# Patient Record
Sex: FEMALE | Race: WHITE | Employment: FULL TIME | URBAN - METROPOLITAN AREA
[De-identification: names, ages, dates, MRNs, and addresses within clinical notes are randomized per-mention and may not be internally consistent; named-entity substitution may affect disease eponyms.]

---

## 2017-11-07 ENCOUNTER — HOSPITAL ENCOUNTER (OUTPATIENT)
Dept: MAMMOGRAPHY | Age: 47
Discharge: HOME | End: 2017-11-07

## 2017-11-07 DIAGNOSIS — Z12.31 VISIT FOR SCREENING MAMMOGRAM: ICD-10-CM

## 2017-11-07 PROCEDURE — 77067 SCR MAMMO BI INCL CAD: CPT

## 2017-11-13 ENCOUNTER — HOSPITAL ENCOUNTER (OUTPATIENT)
Dept: MAMMOGRAPHY | Age: 47
Discharge: HOME | End: 2017-11-13
Payer: COMMERCIAL

## 2017-11-13 DIAGNOSIS — R92.8 ABNORMAL SCREENING MAMMOGRAM: ICD-10-CM

## 2017-11-13 PROCEDURE — 77066 DX MAMMO INCL CAD BI: CPT

## 2017-11-13 PROCEDURE — 76642 ULTRASOUND BREAST LIMITED: CPT

## 2018-09-14 PROBLEM — I78.1 SPIDER VEINS: Status: ACTIVE | Noted: 2018-09-14

## 2018-09-14 PROBLEM — M79.604 RIGHT LEG PAIN: Status: ACTIVE | Noted: 2018-09-14

## 2018-11-16 ENCOUNTER — HOSPITAL ENCOUNTER (OUTPATIENT)
Dept: MAMMOGRAPHY | Age: 48
Discharge: HOME | End: 2018-11-16

## 2018-11-16 DIAGNOSIS — Z12.31 VISIT FOR SCREENING MAMMOGRAM: ICD-10-CM

## 2018-11-16 PROCEDURE — 77067 SCR MAMMO BI INCL CAD: CPT

## 2019-04-08 ENCOUNTER — HOSPITAL ENCOUNTER (OUTPATIENT)
Dept: LAB | Age: 49
Discharge: HOME | End: 2019-04-08

## 2019-04-08 PROCEDURE — 88305 TISSUE EXAM BY PATHOLOGIST: CPT

## 2019-11-05 ENCOUNTER — HOSPITAL ENCOUNTER (OUTPATIENT)
Dept: MAMMOGRAPHY | Age: 49
Discharge: HOME | End: 2019-11-05

## 2019-11-05 DIAGNOSIS — Z12.31 VISIT FOR SCREENING MAMMOGRAM: ICD-10-CM

## 2019-11-05 PROCEDURE — 77067 SCR MAMMO BI INCL CAD: CPT

## 2019-11-11 ENCOUNTER — HOSPITAL ENCOUNTER (OUTPATIENT)
Dept: MAMMOGRAPHY | Age: 49
Discharge: HOME | End: 2019-11-11
Payer: COMMERCIAL

## 2019-11-11 DIAGNOSIS — R92.8 ABNORMAL SCREENING MAMMOGRAM: ICD-10-CM

## 2019-11-11 PROCEDURE — 76642 ULTRASOUND BREAST LIMITED: CPT

## 2021-05-04 ENCOUNTER — TRANSCRIBE ORDER (OUTPATIENT)
Dept: REGISTRATION | Age: 51
End: 2021-05-04

## 2021-05-04 DIAGNOSIS — Z12.31 VISIT FOR SCREENING MAMMOGRAM: Primary | ICD-10-CM

## 2021-06-17 ENCOUNTER — TRANSCRIBE ORDER (OUTPATIENT)
Dept: SCHEDULING | Age: 51
End: 2021-06-17

## 2021-06-17 DIAGNOSIS — Z12.31 ENCOUNTER FOR SCREENING MAMMOGRAM FOR MALIGNANT NEOPLASM OF BREAST: Primary | ICD-10-CM

## 2021-06-22 ENCOUNTER — TRANSCRIBE ORDER (OUTPATIENT)
Dept: SCHEDULING | Age: 51
End: 2021-06-22

## 2021-06-22 DIAGNOSIS — N63.10 BREAST MASS, RIGHT: Primary | ICD-10-CM

## 2021-06-28 ENCOUNTER — HOSPITAL ENCOUNTER (OUTPATIENT)
Dept: MAMMOGRAPHY | Age: 51
Discharge: HOME | End: 2021-06-28
Attending: NURSE PRACTITIONER
Payer: COMMERCIAL

## 2021-06-28 DIAGNOSIS — N63.10 BREAST MASS, RIGHT: ICD-10-CM

## 2021-06-28 PROCEDURE — 77066 DX MAMMO INCL CAD BI: CPT

## 2021-06-28 PROCEDURE — 76642 ULTRASOUND BREAST LIMITED: CPT

## 2022-02-24 ENCOUNTER — TRANSCRIBE ORDER (OUTPATIENT)
Dept: SCHEDULING | Age: 52
End: 2022-02-24

## 2022-02-24 DIAGNOSIS — Z12.31 ENCOUNTER FOR SCREENING MAMMOGRAM FOR MALIGNANT NEOPLASM OF BREAST: Primary | ICD-10-CM

## 2022-03-03 ENCOUNTER — TRANSCRIBE ORDER (OUTPATIENT)
Dept: SCHEDULING | Age: 52
End: 2022-03-03

## 2022-03-03 DIAGNOSIS — Z12.31 VISIT FOR SCREENING MAMMOGRAM: Primary | ICD-10-CM

## 2022-03-19 PROBLEM — I78.1 SPIDER VEINS: Status: ACTIVE | Noted: 2018-09-14

## 2022-03-19 PROBLEM — M79.604 RIGHT LEG PAIN: Status: ACTIVE | Noted: 2018-09-14

## 2022-06-29 ENCOUNTER — HOSPITAL ENCOUNTER (OUTPATIENT)
Dept: MAMMOGRAPHY | Age: 52
Discharge: HOME | End: 2022-07-02
Payer: COMMERCIAL

## 2022-06-29 DIAGNOSIS — Z12.31 VISIT FOR SCREENING MAMMOGRAM: ICD-10-CM

## 2022-06-29 PROCEDURE — 77063 BREAST TOMOSYNTHESIS BI: CPT

## 2022-07-18 LAB
AVERAGE GLUCOSE: NORMAL
HBA1C MFR BLD: 5.4 %

## 2022-12-05 NOTE — PROGRESS NOTES
[facility]  [address] [place] [address]  Mauricio Wood, [address] [facility]  [phone]     2022  Patient:  Caryn Leon  : 1970    JENAE Leon is a 45 y.o. female who presents with a pancreatic cyst.    The patient states that she had a laparoscopic cholecystectomy on 10/16/222. 10 days after her operation she began to have similar pain to her previous gallbladder attacks in the epigastric region and went to the ER and had a CT scan. The patient underwent a CT scan on 2022 which revealed a cystic structure measuring 3.8 cm arising from the pancreatic tail. The patient then underwent an MRI on 2022 which revealed a large thin walled unilocular cyst arising from the pancreatic tail measuring 4.6 cm without additional suspicious features. The patient states that she has not had any pain since returning to the emergency department. She is tolerating a diet. She denies any nausea or emesis. She has no known history of pancreatitis. She states that she rarely drinks, she will have maybe 1 drink/week. She has lost 60 lbs in the last year through diet and exercise. She has started a new weight loss program this year. She has no other significant past medical history. She has a surgical history significant as listed above for a lap myla. She is not on any blood thinning medications. Past Medical History:   Diagnosis Date    Leg pain, right 2018    since 2018     Current Outpatient Medications   Medication Sig Dispense Refill    ibuprofen (ADVIL;MOTRIN) 200 MG tablet Take by mouth       No current facility-administered medications for this visit.      No Known Allergies  Past Surgical History:   Procedure Laterality Date    CHOLECYSTECTOMY       Family History   Problem Relation Age of Onset    Other Father         malignant hyperthermia    Cancer Father         prostate    Breast Cancer Neg Hx      Social History     Tobacco Use    Smoking status: Never    Smokeless tobacco: Never   Substance Use Topics    Alcohol use: Not on file        Review of Systems   A comprehensive review of systems was negative except for that written in the HPI.      Physical Exam  /78   Pulse 67   Ht 5' 6\" (1.676 m)   Wt 190 lb (86.2 kg)   SpO2 98%   BMI 30.67 kg/m²      General: Alert, oriented, cooperative, awake patient in no acute distress   Skin:  Warm, moist with good texture   Eyes:   Sclera are clear, extraocular muscles intact  HENT:  Normocephalic; oral mucosa moist, nares patent; neck is supple; trachea midline  Respiratory: Lungs clear to auscultation bilaterally, breathing is non-labored   Chest:  Symmetric throughout one respiratory excursion; no supraclavicular lymphadenopathy  CV:  Regular rate and rhythm, no appreciable murmurs, rubs, gallops  Abdomen: Soft, protuberant but non-distended; bowel sounds are normoactive   Extremities: No cyanosis, clubbing or edema  Neurological: No focal signs      Labs:   WBC 3.5 - 10.8 K/uL 8.8    RBC 3.86 - 5.35 M/uL 3.83 Low     Hemoglobin 11.0 - 15.4 g/dL 11.7    Hematocrit 35.6 - 47.3 % 36.4    MCV 79.0 - 100.0 fL 95.0    MCH 23.7 - 32.9 pg 30.5    MCHC 30.0 - 36.5 g/dL 32.1    RDW-CV 11.6 - 16.0 % 13.7    Platelets 482 - 325 K/uL 280    MPV 9.2 - 12.8 fL 9.9    Neutrophils, % % 87.3    Lymphocytes, % % 4.6    Monocytes, % % 7.4    Eosinophils % % 0.1    Basophils % % 0.3    Neutrophils, Abs 1.56 - 6.13 K/uL 7.70 High     Lymphocytes, Abs 1.18 - 3.74 K/uL 0.40 Low     Monocytes, Abs 0.24 - 0.86 K/uL 0.70    Eosinophils, Abs 0.04 - 0.36 K/uL <0.03 Low     Basophils, Abs 0.01 - 0.08 K/uL <0.03    Immature Granulocytes, % % 0.3    Immature Granulocytes, Abs 0.00 - 0.03 K/uL 0.00    nRBC Percent 0 % 0    nRBC Abs 0.0 - 0.2 K/uL 0.0       Ref Range & Units 11/1/22 0429   Sodium 136 - 145 mmol/L 140    Potassium 3.5 - 5.1 mmol/L 4.0    Chloride 98 - 107 mmol/L 105    CO2 20 - 30 mmol/L 29    BUN 7 - 20 mg/dL 15    Calcium 8.5 - 10.4 mg/dL 9.4    Creatinine 0.57 - 1.11 mg/dL 0.80    Glucose 70 - 99 mg/dL 164 High     Anion Gap 6 - 16 mmol/L 6       Ref Range & Units 11/1/22 0429   Albumin 3.5 - 5.0 g/dL 3.9    Bilirubin, Total 0.1 - 1.2 mg/dL 1.0    Bilirubin, Direct 0.0 - 0.5 mg/dL 0.7 High     Bilirubin, Indirect 0.0 - 1.2 mg/dL 0.3    Alkaline Phosphatase 40 - 150 IU/L 401 High     AST 5 - 34 IU/L 488 High     ALT <55 IU/L 465 High     Protein, Total 6.2 - 8.3 g/dL 7.4       Ref Range & Units 11/1/22 0429   Lipase 8 - 78 IU/L 93 High        Ref Range & Units 11/1/22 0430   Hep B Surf Antigen [facility]    Comment: Considered negative for HBsAg   Hep A Antibody, IgM Non-Reactive Non-Reactive    Comment: IgM anti-HAV not detected. Does not exclude the possibility of exposure to or infection with HAV. Levels of IgM anti-HAV may be below the cut-off in early infection. Hep B Core Ab, IgM Non-Reactive Non-Reactive    Comment: IgM anti-HBc not detected. Does not exclude the possibility of exposure to or infections with HBV. Hep C Antibody Non-Reactive Non-Reactive          MRI  Impression  Performed by [facility]    1. Large thin-walled unilocular cyst arising from the pancreatic tail measuring up to 4.6 cm without additional suspicious features. Given the size of the lesion, surgical consultation and EUS/FNA is recommended. 2. Decreased size of the small fluid collection in the gallbladder fossa. Signed by: 11/28/2022 8:35 AM: Lauren Izquierdo MD, Rakesh Cavazos  Narrative  Performed by [facility] [place]    MRI ABDOMEN WITH AND WITHOUT IV CONTRAST     CLINICAL INFORMATION: K86.2 Cyst of pancreas I10;       COMPARISON: CT 11/1/2022     TECHNIQUE: Multisequence multiplanar MRI of the abdomen without and with contrast.        CONTRAST: 8.5 mL Gadavist IV contrast     FINDINGS:     LIVER AND BILIARY: Normal signal intensity and enhancement of the liver. 8 mm hemangioma in segment 8. Tiny T2 hyperintense cyst in segment 8. No biliary dilation. Cholecystectomy with decreased size of the T2 hyperintense cystic structure/fluid collection in the gallbladder fossa measuring 1.1 x 0.8 cm, previously 2.0 x 2.4 cm. PANCREAS: Normal signal intensity and enhancement of the pancreatic parenchyma. No pancreatic ductal dilation. Thin-walled unilocular cystic structure arising from the pancreatic tail measuring 3.9 x 3.4 x 4.6 cm (TV by AP by CC). No associated mural nodularity, internal septations or abnormal enhancement. SPLEEN: Unremarkable     ADRENALS: Unremarkable     KIDNEYS: Symmetric enhancement. No hydronephrosis. GI TRACT AND PERITONEUM: No bowel obstruction. No free fluid. VASCULATURE: Nonaneurysmal abdominal aorta. LYMPH NODES: No lymphadenopathy. MUSCULOSKELETAL: No acute bony abnormality     LOWER CHEST/LOCALIZER: Unremarkable        CT:  Impression  Performed by [facility]    1.  Status post cholecystectomy. Small fluid collection in the gallbladder fossa which is sterility indeterminant. 2.  Trace pneumoperitoneum likely related to the recent prior surgery. 3. Cystic structure measuring 3.8 cm arising from the pancreatic tail. Recommend outpatient abdominal MRI with and without contrast to further evaluate. 4. Nonobstructing right renal calculus. Signed by: 11/1/2022 7:28 AM: Valerie Lomeli MD, Leilani Burnette  Narrative  Performed by [facility]        Indication: Acute epigastric pain     Comparison: Ultrasound 10/22/2022     Technique: Axial CT images were obtained of the abdomen and pelvis with intravenous contrast. Oral contrast was not administered. Coronal and sagittal reformats were generated. 100 mL of Omnipaque 350 intravenous contrast was administered. Findings:     Lower chest: Mild atelectasis in the lower lobes bilaterally. Liver: Tiny hypodensity in the right hepatic lobe near the dome. This is too small to definitively characterize by CT. Mild periportal edema.      Gallbladder and Bile Ducts: Cholecystectomy. Small peripherally enhancing cystic structure in the gallbladder fossa (series 201, image 59) measuring 2 x 2.4 cm in axial dimensions and approximately 2.9 cm craniocaudal. No biliary ductal dilatation. Spleen: Normal     Pancreas: Cystic lesion along the body of the pancreatic tail measuring 3.8 cm. Adrenal Glands: Normal     Kidneys: No hydronephrosis. Calculus in the right lower pole measuring 7 mm. Reproductive: IUD in place. Pelvis: Normal.     Bowel:   No dilated bowel loops. No mural thickening or adjacent inflammatory stranding. Appendix not confidently identified. No inflammatory changes in the right lower quadrant. Vasculature: Normal.     Peritoneum/Extraperitoneum: Tiny scattered foci of pneumoperitoneum, likely to recent surgery. No significant free fluid. Lymph nodes: No adenopathy. Musculoskeletal: No acute osseous abnormality. Assessment/Plan:  Jacob Perez is a 45 y.o. female who has signs and symptoms consistent with a cyst on cyst tail of the pancreas. It was discussed with the patient that from her CT scan and MRI reports the cyst does not appear to have suspicious features. It was recommended that she undergo an EUS with FNA to have better evaluation and characterization of the cyst to see if it is serous or mucinous. She will be referred to gastroenterology for EUS. She should follow up in 4 weeks after she has undergone this procedure. If it is found to be mucinous we will then discuss possible surgical intervention.      Total time spent with patient including chart review is 45 minutes     Constanza Diego MD

## 2022-12-06 ENCOUNTER — OFFICE VISIT (OUTPATIENT)
Dept: SURGERY | Age: 52
End: 2022-12-06

## 2022-12-06 VITALS
BODY MASS INDEX: 30.53 KG/M2 | HEIGHT: 66 IN | OXYGEN SATURATION: 98 % | SYSTOLIC BLOOD PRESSURE: 122 MMHG | WEIGHT: 190 LBS | HEART RATE: 67 BPM | DIASTOLIC BLOOD PRESSURE: 78 MMHG

## 2022-12-06 DIAGNOSIS — K86.2 PANCREATIC CYST: Primary | ICD-10-CM

## 2022-12-07 PROBLEM — K86.2 PANCREATIC CYST: Status: ACTIVE | Noted: 2022-12-07

## 2023-01-13 ENCOUNTER — OFFICE VISIT (OUTPATIENT)
Dept: SURGERY | Age: 53
End: 2023-01-13
Payer: COMMERCIAL

## 2023-01-13 VITALS — HEIGHT: 66 IN | WEIGHT: 190 LBS | BODY MASS INDEX: 30.53 KG/M2

## 2023-01-13 DIAGNOSIS — K86.2 PANCREATIC CYST: Primary | ICD-10-CM

## 2023-01-13 PROCEDURE — 99214 OFFICE O/P EST MOD 30 MIN: CPT | Performed by: SURGERY

## 2023-01-13 NOTE — PROGRESS NOTES
[facility]  [address] [place] [address]  [phone]     2023  Patient:  Mary Watson  : 1970    HPI  Mary Watson is a 45 y.o. female who is back to review her EUS result. She has a distal pancreatic cyst. EUS showed suspicious side branch IPMN measuring at 48 mm, however, fluid CEA is only 24, and cytology is negative. On 2022, she presented with a pancreatic cyst. The patient states that she had a laparoscopic cholecystectomy on 10/16/222. 10 days after her operation she began to have similar pain to her previous gallbladder attacks in the epigastric region and went to the ER and had a CT scan. The patient underwent a CT scan on 2022 which revealed a cystic structure measuring 3.8 cm arising from the pancreatic tail. The patient then underwent an MRI on 2022 which revealed a large thin walled unilocular cyst arising from the pancreatic tail measuring 4.6 cm without additional suspicious features. The patient states that she has not had any pain since returning to the emergency department. She is tolerating a diet. She denies any nausea or emesis. She has no known history of pancreatitis. She states that she rarely drinks, she will have maybe 1 drink/week. She has lost 60 lbs in the last year through diet and exercise. She has started a new weight loss program this year. She has no other significant past medical history. She has a surgical history significant as listed above for a lap myla. She is not on any blood thinning medications. Past Medical History:   Diagnosis Date    Leg pain, right 2018    since 2018     Current Outpatient Medications   Medication Sig Dispense Refill    ibuprofen (ADVIL;MOTRIN) 200 MG tablet Take by mouth       No current facility-administered medications for this visit.      No Known Allergies  Past Surgical History:   Procedure Laterality Date    CHOLECYSTECTOMY Family History   Problem Relation Age of Onset    Other Father         malignant hyperthermia    Cancer Father         prostate    Breast Cancer Neg Hx      Social History     Tobacco Use    Smoking status: Never    Smokeless tobacco: Never   Substance Use Topics    Alcohol use: Not on file        Review of Systems   A comprehensive review of systems was negative except for that written in the HPI. Physical Exam  Ht 5' 6\" (1.676 m)   Wt 190 lb (86.2 kg)   BMI 30.67 kg/m²      General: Alert, oriented, cooperative, awake patient in no acute distress   Skin:  Warm, moist with good texture   Eyes:   Sclera are clear, extraocular muscles intact  HENT:  Normocephalic; oral mucosa moist, nares patent; neck is supple; trachea midline  Respiratory: Lungs clear to auscultation bilaterally, breathing is non-labored   Chest:  Symmetric throughout one respiratory excursion; no supraclavicular lymphadenopathy  CV:  Regular rate and rhythm, no appreciable murmurs, rubs, gallops  Abdomen: Soft, protuberant but non-distended; bowel sounds are normoactive   Extremities: No cyanosis, clubbing or edema  Neurological: No focal signs      CT:      MRI:      EUS:  Date of Procedure: 12/22/2022    Patient: Jaimee Greenwood 1970    Indication: Pancreatic cyst    Sedation: MAC    Pre-Procedure Physical Exam:    Mental status: alert and oriented  Airway: normal oropharyngeal airway and neck mobility  CV: regular rate and rhythm  Respiratory: clear to auscultation    Procedure:  A History and Physical has been performed, and patient medication allergies have been reviewed. Risks of perforation, hemorrhage, adverse drug reaction, and aspiration were discussed. Informed consent was obtained for the procedure, including sedation. The patient was placed in the left lateral decubitus position. The heart rate, oxygen saturations, blood pressure, and response to care were monitored throughout the procedure.      The linear echoendoscope was passed through the mouth and advanced under direct vision to the distal duodenum. As the scope was slowly withdrawn, detailed endoscopic images were obtained from the surrounding organs. The patient tolerated the procedure well. Findings:     ENDOSCOPIC FINDINGS: Limited views of the mucosa with the echoendoscope reveals no gross abnormalities of the stomach or proximal duodenum. The ampulla is well-visualized endoscopically and appears normal.    PANCREAS: The pancreas is well-visualized from head to tail. There is no evidence of chronic pancreatitis. There is a simple cyst in the tail of the pancreas measuring 48 x 35 mm. It is oval-shaped, with a thin wall, and without septations or mural nodules. There is apparent communication with the pancreatic duct. After IV Levaquin was given, fine needle was performed using a 22-gauge [employer] needle. A single pass was performed, yielding 5 ml thick clear fluid. No additional pancreatic lesions were visualized. The main pancreatic duct is of normal caliber with a smooth, regular course, measuring up to 3 mm in the head, 2 mm in the body and 1 mm in the tail. Pancreas divisum is not seen. BILIARY TREE: The gallbladder appears normal. The common bile duct is well-visualized from its insertion in the ampulla to the bifurcation of right and left hepatic ducts. It is non-dilated, measuring up to 5 mm maximally with a gradual taper down to the level of the ampulla. There are no intraductal stones, sludge or debris. The ampulla appears normal endosonographically. OTHER ORGANS: Views of the left lobe of the liver demonstrate no solid mass lesions. There is no ascites in the upper abdomen. The left adrenal gland appears normal. The major vascular structures including the portal vein, splenic vessels and celiac artery appear unremarkable. There are no pathologically enlarged posterior mediastinal or upper abdominal lymph nodes.     Specimen: Yes    Estimated Blood Loss: 3 ml    Implant: None    Impression:   1. Pancreatic cyst. This is suspicious for side branch IPMN or mucinous cystic neoplasm. FNA was performed. Plan:  1. Follow up results of cytology, biochemical and molecular analysis. 2. Avoid NSAIDs for 48 hours. 3. Further recommendations will be based on pathology results. Signed:  Nupur Hdz MD  12/22/2022  8:25 AM      Fluid analysis:      Cytology:        Assessment/Plan:   Linda Foley is a 45 y.o. female who has signs and symptoms consistent with distal pancreatic cyst, most likely serous cyst based on fluid CEA level, which has low risk for cancer. Recommend to repeat scan in a year, if stable could scan once in a few year unless symptomatic.      Pam Caro MD

## 2023-01-31 DIAGNOSIS — K86.2 PANCREATIC CYST: Primary | ICD-10-CM

## 2023-02-01 ENCOUNTER — HOSPITAL ENCOUNTER (OUTPATIENT)
Dept: LAB | Age: 53
Discharge: HOME | End: 2023-02-04
Payer: COMMERCIAL

## 2023-02-01 ENCOUNTER — OFFICE VISIT (OUTPATIENT)
Dept: ONCOLOGY | Age: 53
End: 2023-02-01
Payer: COMMERCIAL

## 2023-02-01 VITALS
WEIGHT: 189 LBS | HEART RATE: 82 BPM | SYSTOLIC BLOOD PRESSURE: 97 MMHG | BODY MASS INDEX: 30.37 KG/M2 | OXYGEN SATURATION: 99 % | RESPIRATION RATE: 16 BRPM | TEMPERATURE: 97.7 F | DIASTOLIC BLOOD PRESSURE: 65 MMHG | HEIGHT: 66 IN

## 2023-02-01 DIAGNOSIS — K86.2 PANCREATIC CYST: Primary | ICD-10-CM

## 2023-02-01 DIAGNOSIS — K86.2 PANCREATIC CYST: ICD-10-CM

## 2023-02-01 LAB
ALBUMIN SERPL-MCNC: 4.4 G/DL (ref 3.5–5)
ALBUMIN/GLOB SERPL: 1.2 (ref 0.4–1.6)
ALP SERPL-CCNC: 77 U/L (ref 50–136)
ALT SERPL-CCNC: 29 U/L (ref 12–65)
ANION GAP SERPL CALC-SCNC: 3 MMOL/L (ref 2–11)
AST SERPL-CCNC: 13 U/L (ref 15–37)
BASOPHILS # BLD: 0 K/UL (ref 0–0.2)
BASOPHILS NFR BLD: 1 % (ref 0–2)
BILIRUB SERPL-MCNC: 0.6 MG/DL (ref 0.2–1.1)
BUN SERPL-MCNC: 17 MG/DL (ref 6–23)
CALCIUM SERPL-MCNC: 10 MG/DL (ref 8.3–10.4)
CEA SERPL-MCNC: 1.8 NG/ML (ref 0–3)
CHLORIDE SERPL-SCNC: 106 MMOL/L (ref 101–110)
CO2 SERPL-SCNC: 31 MMOL/L (ref 21–32)
CREAT SERPL-MCNC: 0.9 MG/DL (ref 0.6–1)
DIFFERENTIAL METHOD BLD: ABNORMAL
EOSINOPHIL # BLD: 0.1 K/UL (ref 0–0.8)
EOSINOPHIL NFR BLD: 1 % (ref 0.5–7.8)
ERYTHROCYTE [DISTWIDTH] IN BLOOD BY AUTOMATED COUNT: 13.2 % (ref 11.9–14.6)
GLOBULIN SER CALC-MCNC: 3.7 G/DL (ref 2.8–4.5)
GLUCOSE SERPL-MCNC: 92 MG/DL (ref 65–100)
HCT VFR BLD AUTO: 40.4 % (ref 35.8–46.3)
HGB BLD-MCNC: 12.4 G/DL (ref 11.7–15.4)
IMM GRANULOCYTES # BLD AUTO: 0 K/UL (ref 0–0.5)
IMM GRANULOCYTES NFR BLD AUTO: 0 % (ref 0–5)
LYMPHOCYTES # BLD: 1.6 K/UL (ref 0.5–4.6)
LYMPHOCYTES NFR BLD: 30 % (ref 13–44)
MCH RBC QN AUTO: 29 PG (ref 26.1–32.9)
MCHC RBC AUTO-ENTMCNC: 30.7 G/DL (ref 31.4–35)
MCV RBC AUTO: 94.4 FL (ref 82–102)
MONOCYTES # BLD: 0.4 K/UL (ref 0.1–1.3)
MONOCYTES NFR BLD: 7 % (ref 4–12)
NEUTS SEG # BLD: 3.3 K/UL (ref 1.7–8.2)
NEUTS SEG NFR BLD: 61 % (ref 43–78)
NRBC # BLD: 0 K/UL (ref 0–0.2)
PLATELET # BLD AUTO: 312 K/UL (ref 150–450)
PMV BLD AUTO: 9.8 FL (ref 9.4–12.3)
POTASSIUM SERPL-SCNC: 4.2 MMOL/L (ref 3.5–5.1)
PROT SERPL-MCNC: 8.1 G/DL (ref 6.3–8.2)
RBC # BLD AUTO: 4.28 M/UL (ref 4.05–5.2)
SODIUM SERPL-SCNC: 140 MMOL/L (ref 133–143)
WBC # BLD AUTO: 5.4 K/UL (ref 4.3–11.1)

## 2023-02-01 PROCEDURE — 99244 OFF/OP CNSLTJ NEW/EST MOD 40: CPT | Performed by: INTERNAL MEDICINE

## 2023-02-01 PROCEDURE — 80053 COMPREHEN METABOLIC PANEL: CPT

## 2023-02-01 PROCEDURE — 85025 COMPLETE CBC W/AUTO DIFF WBC: CPT

## 2023-02-01 PROCEDURE — 36415 COLL VENOUS BLD VENIPUNCTURE: CPT

## 2023-02-01 PROCEDURE — 82378 CARCINOEMBRYONIC ANTIGEN: CPT

## 2023-02-01 RX ORDER — ADHESIVE TAPE 3"X 2.3 YD
TAPE, NON-MEDICATED TOPICAL
COMMUNITY

## 2023-02-01 ASSESSMENT — PATIENT HEALTH QUESTIONNAIRE - PHQ9
SUM OF ALL RESPONSES TO PHQ9 QUESTIONS 1 & 2: 0
SUM OF ALL RESPONSES TO PHQ QUESTIONS 1-9: 0
1. LITTLE INTEREST OR PLEASURE IN DOING THINGS: 0
SUM OF ALL RESPONSES TO PHQ QUESTIONS 1-9: 0
2. FEELING DOWN, DEPRESSED OR HOPELESS: 0

## 2023-02-01 NOTE — PROGRESS NOTES
[facility] Hematology & Oncology: Office Visit New Patient H/P    Chief Complaint:    History of clotting disorder    History of Present Illness:  Reason for Referral:  Clotting disorder     Referring Provider:  Ladi Cyr NP, [facility]     Primary Care Provider:  Tyrone Steiner NP, [facility]     Family History of Cancer/Hematologic Disorders:  Father with prostate cancer and malignant hyperthermia. Presenting Symptoms:   History of clotting disorder     Ms. Nicolás Shore is a 45 y.o.  female with a medical history of colon polyps, asthma, HPV, malignant hyperthermia, and clotting disorder diagnosed after placental abruption. Surgical history includes cholecystectomy (10/22). She denies use of any tobacco products or drugs but does consume alcohol socially. Documented that patient was diagnosed with a clotting disorder after a placental abruption. There are no supporting labs available including what year this occurred to give any specifics. There was no further follow up with hematology. On 12/9/22 she was seen on consult with GI and requested referral to hematology for disorder. On 10/22/22 she presented to the [facility] ED with sudden onset epigastric pain. An [address] was completed which showed cholelithiasis with gallbladder wall thickening. She was admitted with plan to proceed with cholecystectomy. 10 days post op she presented back to [facility] ED with same epigastric pain. CT imaging showed a 3.8 cm cystic structure arising from the pancreatic tail. MRI was recommended and completed on 11/28/22 which confirmed findings. EUS was recommended. On 12/6/22 she was seen in consult by Dr. Marielena Prince for evaluation. [facility]. This procedure was performed on 12/22/22. Pancreatic cyst is suspicious for side branch IPMN or mucinous cystic neoplasm. FNA was performed.   Pathology showed scant extracellular mucin present, virtually acellular specimen. CEA 24 and amylase 74 on pancreatic cyst fluid. On 1/13/23 she was seen in follow up with Dr. Caffie Cockayne.  Pathology reviewed. Recommendation was to follow up in 1 year with repeat imaging or sooner if symptomatic.        10/22/22 [facility] US Abd  FINDINGS:   LIVER: Normal echogenicity of the hepatic parenchyma. Liver is enlarged measuring 19.2 cm. Minimal intrahepatic biliary dilation. Common bile duct measures 6 mm. Patent hepatopedal main portal vein. GALLBLADDER: Multiple mobile gallstones noted. Adenomyomatosis along the anterior gallbladder wall. Mildly distended gallbladder. Gallbladder wall is mildly thickened measuring 5 mm. Trace pericholecystic fluid. Negative sonographic Dahl sign, though the patient had received pain medication prior to the examination and this finding is unreliable. PANCREAS: Normal imaged portions of the pancreatic head and proximal body. RIGHT KIDNEY: Normal cortical thickness and echogenicity. No hydronephrosis. Nonobstructing 8 mm lower pole calculus. ASCITES: None. IMPRESSION:   1. Cholelithiasis with gallbladder wall thickening and trace pericholecystic fluid, suspicious for acute cholecystitis. Negative sonographic Dahl sign, though the patient had received pain medication prior to the examination and this finding is unreliable. Could consider further evaluation with nuclear medicine HIDA scan if indicated. 2. Minimal intrahepatic biliary dilation with normal caliber of the common bile duct and no obstructing calculus identified. Correlate with LFTs. Could consider further evaluation with MRCP or ERCP if indicated. 3. Hepatomegaly. 4. Focal gallbladder wall adenomyomatosis. 10/22/22 [facility] Cholecystectomy   Final Diagnosis   Gallbladder, cholecystectomy:  Cholelithiasis with chronic cholecystitis. 11/1/22 [facility] CT Abd/Pelvis  Findings:   Lower chest: Mild atelectasis in the lower lobes bilaterally.    Liver: Tiny hypodensity in the right hepatic lobe near the dome. This is too small to definitively characterize by CT. Mild periportal edema. Gallbladder and Bile Ducts: Cholecystectomy. Small peripherally enhancing cystic structure in the gallbladder fossa (series 201, image 59) measuring 2 x 2.4 cm in axial dimensions and approximately 2.9 cm craniocaudal. No biliary ductal dilatation. Spleen: Normal   Pancreas: Cystic lesion along the body of the pancreatic tail measuring 3.8 cm. Adrenal Glands: Normal   Kidneys: No hydronephrosis. Calculus in the right lower pole measuring 7 mm. Reproductive: IUD in place. Pelvis: Normal.   Bowel:   No dilated bowel loops. No mural thickening or adjacent inflammatory stranding. Appendix not confidently identified. No inflammatory changes in the right lower quadrant. Vasculature: Normal.   Peritoneum/Extraperitoneum: Tiny scattered foci of pneumoperitoneum, likely to recent surgery. No significant free fluid. Lymph nodes: No adenopathy. Musculoskeletal: No acute osseous abnormality. IMPRESSION:   1. Status post cholecystectomy. Small fluid collection in the gallbladder fossa which is sterility indeterminant. 2.  Trace pneumoperitoneum likely related to the recent prior surgery. 3. Cystic structure measuring 3.8 cm arising from the pancreatic tail. Recommend outpatient abdominal MRI with and without contrast to further evaluate. 4. Nonobstructing right renal calculus. 11/28/22 [facility] MRI Abd  FINDINGS:   LIVER AND BILIARY: Normal signal intensity and enhancement of the liver. 8 mm hemangioma in segment 8. Tiny T2 hyperintense cyst in segment 8. No biliary dilation. Cholecystectomy with decreased size of the T2 hyperintense cystic structure/fluid collection in the gallbladder fossa measuring 1.1 x 0.8 cm, previously 2.0 x 2.4 cm. PANCREAS: Normal signal intensity and enhancement of the pancreatic parenchyma. No pancreatic ductal dilation.  Thin-walled unilocular cystic structure arising from the pancreatic tail measuring 3.9 x 3.4 x 4.6 cm (TV by AP by CC). No associated mural nodularity, internal septations or abnormal enhancement. SPLEEN: Unremarkable      ADRENALS: Unremarkable   KIDNEYS: Symmetric enhancement. No hydronephrosis. GI TRACT AND PERITONEUM: No bowel obstruction. No free fluid. VASCULATURE: Nonaneurysmal abdominal aorta. LYMPH NODES: No lymphadenopathy. MUSCULOSKELETAL: No acute bony abnormality   LOWER CHEST/LOCALIZER: Unremarkable      IMPRESSION:   1. Large thin-walled unilocular cyst arising from the pancreatic tail measuring up to 4.6 cm without additional suspicious features. Given the size of the lesion, surgical consultation and EUS/FNA is recommended. 2. Decreased size of the small fluid collection in the gallbladder fossa. 12/22/22 [facility] EUS and Pathology  Findings:   ENDOSCOPIC FINDINGS: Limited views of the mucosa with the echoendoscope reveals no gross abnormalities of the stomach or proximal duodenum. The ampulla is well-visualized endoscopically and appears normal.  PANCREAS: The pancreas is well-visualized from head to tail. There is no evidence of chronic pancreatitis. There is a simple cyst in the tail of the pancreas measuring 48 x 35 mm. It is oval-shaped, with a thin wall, and without septations or mural nodules. There is apparent communication with the pancreatic duct. After IV Levaquin was given, fine needle was performed using a 22-gauge [employer] needle. A single pass was performed, yielding 5 ml thick clear fluid. No additional pancreatic lesions were visualized. The main pancreatic duct is of normal caliber with a smooth, regular course, measuring up to 3 mm in the head, 2 mm in the body and 1 mm in the tail. Pancreas divisum is not seen.    BILIARY TREE: The gallbladder appears normal. The common bile duct is well-visualized from its insertion in the ampulla to the bifurcation of right and left hepatic ducts. It is non-dilated, measuring up to 5 mm maximally with a gradual taper down to the level of the ampulla. There are no intraductal stones, sludge or debris. The ampulla appears normal endosonographically. OTHER ORGANS: Views of the left lobe of the liver demonstrate no solid mass lesions. There is no ascites in the upper abdomen. The left adrenal gland appears normal. The major vascular structures including the portal vein, splenic vessels and celiac artery appear unremarkable. There are no pathologically enlarged posterior mediastinal or upper abdominal lymph nodes. Specimen: Yes     Impression:   1. Pancreatic cyst. This is suspicious for side branch IPMN or mucinous cystic neoplasm. FNA was performed. Notes from Referring Provider: None     Other Pertinent Information:  Covid vaccination - 4/6/21 and 5/4/21     Follow up with Dr. Jana Ferrer in 1 year with repeat imaging to follow pancreatic cyst.      Review of Systems:  Constitutional Denies fever or chills. Denies weight loss or appetite changes. Denies fatigue. Denies anorexia. HEENT Denies trauma, bluring vision, hearing loss, ear pain, nosebleeds, sore throat, neck pain and ear discharge. Skin Denies lesions or rashes. Lungs Denies shortness of breath, cough, sputum production or hemoptysis. Cardiovascular Denies chest pain, palpitations, orthopnea, claudication and leg swelling. Gastrointestinal Denies nausea, vomiting, bowel changes. Denies bloody or black stools. Denies abdominal pain.  Denies dysuria, frequency or hesitancy of urination   Neuro Denies headaches, visual changes or ataxia. Denies dizziness, tingling, tremors, sensory change, speech change, focal weakness and headaches. Hematology Denies nasal/gum bleeding, denies easy bruise   Endo Denies heat/cold intolerance, denies diabetes. MSK Denies back pain, swollen legs, myalgias and falls.      Psychiatric/Behavioral Denies depression and substance abuse. The patient is not nervous/anxious. No Known Allergies  Past Medical History:   Diagnosis Date    Leg pain, right 07/2018    since 7/2018     Past Surgical History:   Procedure Laterality Date    CHOLECYSTECTOMY       Family History   Problem Relation Age of Onset    Other Father         malignant hyperthermia    Cancer Father         prostate    Breast Cancer Neg Hx      Social History     Socioeconomic History    Marital status:      Spouse name: Not on file    Number of children: Not on file    Years of education: Not on file    Highest education level: Not on file   Occupational History    Not on file   Tobacco Use    Smoking status: Never    Smokeless tobacco: Never   Substance and Sexual Activity    Alcohol use: Not on file    Drug use: Not on file    Sexual activity: Not on file   Other Topics Concern    Not on file   Social History Narrative    Not on file     Social Determinants of Health     Financial Resource Strain: Not on file   Food Insecurity: Not on file   Transportation Needs: Not on file   Physical Activity: Not on file   Stress: Not on file   Social Connections: Not on file   Intimate Partner Violence: Not on file   Housing Stability: Not on file     Current Outpatient Medications   Medication Sig Dispense Refill    Magnesium Oxide 200 MG TABS Take by mouth      Probiotic Product (PROBIOTIC DAILY PO) Take by mouth      ibuprofen (ADVIL;MOTRIN) 200 MG tablet Take by mouth       No current facility-administered medications for this visit. OBJECTIVE:  BP 97/65 (Site: Right Upper Arm, Position: Standing, Cuff Size: Large Adult)   Pulse 82   Temp 97.7 °F (36.5 °C) (Oral)   Resp 16   Ht 5' 5.5\" (1.664 m)   Wt 189 lb (85.7 kg)   SpO2 99%   BMI 30.97 kg/m²     Physical Exam:  Constitutional: Oriented to person, place, and time. Well-developed and well-nourished. HEENT: Normocephalic and atraumatic.  Oropharynx is clear and moist.   Conjunctivae and EOM are normal. Pupils are equal, round, and reactive to light. No scleral icterus. Neck supple. No JVD present. No tracheal deviation present. No thyromegaly present. Lymph node No palpable submandibular, cervical, supraclavicular, axillary and inguinal lymph nodes. Skin Warm and dry. No bruising and no rash noted. No erythema. No pallor. Respiratory Effort normal and breath sounds normal.  No respiratory distress. No wheezes. No rales. No tenderness. CVS Normal rate, regular rhythm and normal heart sounds. Exam reveals no gallop, no friction and no rub. No murmur heard. Abdomen Soft. Bowel sounds are normal. Exhibits no distension. There is no tenderness. There is no rebound and no guarding. Neuro Normal reflexes. No cranial nerve deficit. Exhibits normal muscle tone, 5 of 5 strength of all extremities. MSK Normal range of motion in general.  No edema and no tenderness.    Psych Normal mood, affect, behavior, judgment and thought content      Labs:  Recent Results (from the past 24 hour(s))   CBC with Auto Differential    Collection Time: 02/01/23  3:26 PM   Result Value Ref Range    WBC 5.4 4.3 - 11.1 K/uL    RBC 4.28 4.05 - 5.2 M/uL    Hemoglobin 12.4 11.7 - 15.4 g/dL    Hematocrit 40.4 35.8 - 46.3 %    MCV 94.4 82.0 - 102.0 FL    MCH 29.0 26.1 - 32.9 PG    MCHC 30.7 (L) 31.4 - 35.0 g/dL    RDW 13.2 11.9 - 14.6 %    Platelets 297 104 - 899 K/uL    MPV 9.8 9.4 - 12.3 FL    nRBC 0.00 0.0 - 0.2 K/uL    Differential Type AUTOMATED      Seg Neutrophils 61 43 - 78 %    Lymphocytes 30 13 - 44 %    Monocytes 7 4.0 - 12.0 %    Eosinophils % 1 0.5 - 7.8 %    Basophils 1 0.0 - 2.0 %    Immature Granulocytes 0 0.0 - 5.0 %    Segs Absolute 3.3 1.7 - 8.2 K/UL    Absolute Lymph # 1.6 0.5 - 4.6 K/UL    Absolute Mono # 0.4 0.1 - 1.3 K/UL    Absolute Eos # 0.1 0.0 - 0.8 K/UL    Basophils Absolute 0.0 0.0 - 0.2 K/UL    Absolute Immature Granulocyte 0.0 0.0 - 0.5 K/UL   Comprehensive Metabolic Panel    Collection Time: 02/01/23  3:26 PM   Result Value Ref Range    Sodium 140 133 - 143 mmol/L    Potassium 4.2 3.5 - 5.1 mmol/L    Chloride 106 101 - 110 mmol/L    CO2 31 21 - 32 mmol/L    Anion Gap 3 2 - 11 mmol/L    Glucose 92 65 - 100 mg/dL    BUN 17 6 - 23 MG/DL    Creatinine 0.90 0.6 - 1.0 MG/DL    Est, Glom Filt Rate >60 >60 ml/min/1.73m2    Calcium 10.0 8.3 - 10.4 MG/DL    Total Bilirubin 0.6 0.2 - 1.1 MG/DL    ALT 29 12 - 65 U/L    AST 13 (L) 15 - 37 U/L    Alk Phosphatase 77 50 - 136 U/L    Total Protein 8.1 6.3 - 8.2 g/dL    Albumin 4.4 3.5 - 5.0 g/dL    Globulin 3.7 2.8 - 4.5 g/dL    Albumin/Globulin Ratio 1.2 0.4 - 1.6     CEA    Collection Time: 02/01/23  3:30 PM   Result Value Ref Range    CEA 1.8 0.0 - 3.0 ng/mL       Imaging:  No results found for this or any previous visit. ASSESSMENT/PLAN:   Diagnosis Orders   1. Pancreatic cyst  CEA        45 y.o. F consulted for clotting disorder presented [facility] on [facility]/1/2023. She had her second pregnancy over 20 years ago complicated with placenta rupture, was hospitalized for 2 days and somehow added to the problem list of clotting disorder. However this was not considered a concern to be followed for work-up at the time, neither she remembers any details, nonetheless she never had any history of thrombosis or abnormal bleeding. She had a emergent cholecystectomy in 10/2022, also had work-up of pancreatic cysts for IPMN, questions were raised regarding his long history of clotting disorder  in her record and consulting hematology.   We discussed the placenta rupture and, anemia associated with abnormal coagulation labs and can be as worse as DIC, and her history of normal pregnancy/delivery twice after the event, multiple surgeries and procedures without issues of bleeding or thrombosis are all against a diagnosis of clinically significant clotting disorder, she agrees and does not desire extensive blood test that will not , but requested to repeat CEA for her pancreatic cyst and understands need to follow-up with surgery for IPMN surveillance. She is elevated for Virchow's triad and healthy lifestyles to mitigate the risk of thrombosis. Return as needed    All questions are answered to her satisfaction. She will call for further questions and concerns. ECOG PERFORMANCE STATUS - 0-Fully active, able to carry on all pre-disease performance without restriction. Pain - 0 - No pain/10. None/Minimal pain - not affecting QOL     Fatigue - No flowsheet data found. Distress - No flowsheet data found. Elements of this note have been dictated via voice recognition software. Text and phrases may be limited by the accuracy and autoconversion of the software. The chart has been reviewed, but errors may still be present. Felisa Reaves M.D.   [address]  Office : [phone]  Fax : [phone]

## 2023-02-01 NOTE — PROGRESS NOTES
[facility]      Reason for Referral:  Clotting disorder    Referring Provider:  Julita Ramachandran NP, [facility]    Primary Care Provider:  Prabha Burgess NP, [facility]    Family History of Cancer/Hematologic Disorders:  Father with prostate cancer and malignant hyperthermia. Presenting Symptoms:   History of clotting disorder    Narrative with recent with Results/Procedures/Biopsies and Dates completed:  Ms. 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 Surgical history includes cholecystectomy (10/22). She denies use of any tobacco products or drugs but does consume alcohol socially. Documented that patient was diagnosed with a clotting disorder after a placental abruption. There are no supporting labs available including what year this occurred to give any specifics. There was no further follow up with hematology. On 12/9/22 she was seen on consult with GI and requested referral to hematology for disorder. On 10/22/22 she presented to the [facility] ED with sudden onset epigastric pain. An [address] was completed which showed cholelithiasis with gallbladder wall thickening. She was admitted with plan to proceed with cholecystectomy. 10 days post op she presented back to [facility] ED with same epigastric pain. CT imaging showed a 3.8 cm cystic structure arising from the pancreatic tail. MRI was recommended and completed on 11/28/22 which confirmed findings. EUS was recommended. On 12/6/22 she was seen in consult by Dr. Temitope Chandra for evaluation. [facility]erral to [facility] for [facility]. This procedure was performed on 12/22/22. Pancreatic cyst is suspicious for side branch IPMN or mucinous cystic neoplasm. FNA was performed. Pathology showed scant extracellular mucin present, virtually acellular specimen.   CEA 24 and amylase 74 on pancreatic cyst fluid.    On 1/13/23 she was seen in follow up with Dr. Franks.  Pathology reviewed.  Recommendation was to follow up in 1 year with repeat imaging or sooner if symptomatic.       10/22/22 [facility] US Abd  FINDINGS:   LIVER: Normal echogenicity of the hepatic parenchyma. Liver is enlarged measuring 19.2 cm. Minimal intrahepatic biliary dilation. Common bile duct measures 6 mm. Patent hepatopedal main portal vein.   GALLBLADDER: Multiple mobile gallstones noted. Adenomyomatosis along the anterior gallbladder wall. Mildly distended gallbladder. Gallbladder wall is mildly thickened measuring 5 mm. Trace pericholecystic fluid. Negative sonographic Dahl sign, though the patient had received pain medication prior to the examination and this finding is unreliable.   PANCREAS: Normal imaged portions of the pancreatic head and proximal body.   RIGHT KIDNEY: Normal cortical thickness and echogenicity. No hydronephrosis. Nonobstructing 8 mm lower pole calculus.   ASCITES: None.     IMPRESSION:   1. Cholelithiasis with gallbladder wall thickening and trace pericholecystic fluid, suspicious for acute cholecystitis. Negative sonographic Dahl sign, though the patient had received pain medication prior to the examination and this finding is unreliable. Could consider further evaluation with nuclear medicine HIDA scan if indicated.   2. Minimal intrahepatic biliary dilation with normal caliber of the common bile duct and no obstructing calculus identified. Correlate with LFTs. Could consider further evaluation with MRCP or ERCP if indicated.   3. Hepatomegaly.   4. Focal gallbladder wall adenomyomatosis.    10/22/22 [facility] Cholecystectomy   Final Diagnosis   Gallbladder, cholecystectomy:  Cholelithiasis with chronic cholecystitis.    11/1/22 [facility] CT Abd/Pelvis  Findings:   Lower chest: Mild atelectasis in the lower lobes bilaterally.   Liver: Tiny hypodensity in the right hepatic lobe near the dome. This is  too small to definitively characterize by CT. Mild periportal edema.   Gallbladder and Bile Ducts: Cholecystectomy. Small peripherally enhancing cystic structure in the gallbladder fossa (series 201, image 59) measuring 2 x 2.4 cm in axial dimensions and approximately 2.9 cm craniocaudal. No biliary ductal dilatation.   Spleen: Normal   Pancreas: Cystic lesion along the body of the pancreatic tail measuring 3.8 cm.   Adrenal Glands: Normal   Kidneys: No hydronephrosis. Calculus in the right lower pole measuring 7 mm.   Reproductive: IUD in place.   Pelvis: Normal.   Bowel:   No dilated bowel loops.   No mural thickening or adjacent inflammatory stranding.   Appendix not confidently identified. No inflammatory changes in the right lower quadrant.   Vasculature: Normal.   Peritoneum/Extraperitoneum: Tiny scattered foci of pneumoperitoneum, likely to recent surgery. No significant free fluid.   Lymph nodes: No adenopathy.   Musculoskeletal: No acute osseous abnormality.     IMPRESSION:   1.  Status post cholecystectomy. Small fluid collection in the gallbladder fossa which is sterility indeterminant.   2.  Trace pneumoperitoneum likely related to the recent prior surgery.   3. Cystic structure measuring 3.8 cm arising from the pancreatic tail. Recommend outpatient abdominal MRI with and without contrast to further evaluate.   4. Nonobstructing right renal calculus.     11/28/22 [facility] MRI Abd  FINDINGS:   LIVER AND BILIARY: Normal signal intensity and enhancement of the liver. 8 mm hemangioma in segment 8. Tiny T2 hyperintense cyst in segment 8. No biliary dilation.  Cholecystectomy with decreased size of the T2 hyperintense cystic structure/fluid collection in the gallbladder fossa measuring 1.1 x 0.8 cm, previously 2.0 x 2.4 cm.   PANCREAS: Normal signal intensity and enhancement of the pancreatic parenchyma. No pancreatic ductal dilation. Thin-walled unilocular cystic structure arising from the pancreatic tail  measuring 3.9 x 3.4 x 4.6 cm (TV by AP by CC). No associated mural nodularity, internal septations or abnormal enhancement. SPLEEN: Unremarkable     ADRENALS: Unremarkable   KIDNEYS: Symmetric enhancement. No hydronephrosis. GI TRACT AND PERITONEUM: No bowel obstruction. No free fluid. VASCULATURE: Nonaneurysmal abdominal aorta. LYMPH NODES: No lymphadenopathy. MUSCULOSKELETAL: No acute bony abnormality   LOWER CHEST/LOCALIZER: Unremarkable     IMPRESSION:   1. Large thin-walled unilocular cyst arising from the pancreatic tail measuring up to 4.6 cm without additional suspicious features. Given the size of the lesion, surgical consultation and EUS/FNA is recommended. 2. Decreased size of the small fluid collection in the gallbladder fossa. 12/22/22 [facility] EUS and Pathology  Findings:   ENDOSCOPIC FINDINGS: Limited views of the mucosa with the echoendoscope reveals no gross abnormalities of the stomach or proximal duodenum. The ampulla is well-visualized endoscopically and appears normal.  PANCREAS: The pancreas is well-visualized from head to tail. There is no evidence of chronic pancreatitis. There is a simple cyst in the tail of the pancreas measuring 48 x 35 mm. It is oval-shaped, with a thin wall, and without septations or mural nodules. There is apparent communication with the pancreatic duct. After IV Levaquin was given, fine needle was performed using a 22-gauge [employer] needle. A single pass was performed, yielding 5 ml thick clear fluid. No additional pancreatic lesions were visualized. The main pancreatic duct is of normal caliber with a smooth, regular course, measuring up to 3 mm in the head, 2 mm in the body and 1 mm in the tail. Pancreas divisum is not seen. BILIARY TREE: The gallbladder appears normal. The common bile duct is well-visualized from its insertion in the ampulla to the bifurcation of right and left hepatic ducts.  It is non-dilated, measuring up to 5 mm maximally with a gradual taper down to the level of the ampulla. There are no intraductal stones, sludge or debris. The ampulla appears normal endosonographically. OTHER ORGANS: Views of the left lobe of the liver demonstrate no solid mass lesions. There is no ascites in the upper abdomen. The left adrenal gland appears normal. The major vascular structures including the portal vein, splenic vessels and celiac artery appear unremarkable. There are no pathologically enlarged posterior mediastinal or upper abdominal lymph nodes. Specimen: Yes    Impression:   1. Pancreatic cyst. This is suspicious for side branch IPMN or mucinous cystic neoplasm. FNA was performed. Notes from Referring Provider: None    Other Pertinent Information:  Covid vaccination - 4/6/21 and 5/4/21    Follow up with Dr. Joshua García in 1 year with repeat imaging to follow pancreatic cyst.      Presented at Tumor Board:   No

## 2023-02-01 NOTE — PATIENT INSTRUCTIONS
Patient Instructions from Today's Visit    Reason for Visit:  New patient    Diagnosis Information:  [email or web address] net/about-us/asco-answers-patient-education-materials/igqg-rgrcxei-ofoa-sheets      Plan:  Unless you develop a blood clot, there is no prophylaxis      Avoid sedentary lifestyle  Avoid smoking and alcohol  Hormonal treatments or contraceptives (estrogen) increase the risk for blood clots. CEA add on per your request due to family history cancer.      Follow Up:  As needed    Recent Lab Results:  Hospital Outpatient Visit on 02/01/2023   Component Date Value Ref Range Status    WBC 02/01/2023 5.4  4.3 - 11.1 K/uL Final    RBC 02/01/2023 4.28  4.05 - 5.2 M/uL Final    Hemoglobin 02/01/2023 12.4  11.7 - 15.4 g/dL Final    Hematocrit 02/01/2023 40.4  35.8 - 46.3 % Final    MCV 02/01/2023 94.4  82.0 - 102.0 FL Final    MCH 02/01/2023 29.0  26.1 - 32.9 PG Final    MCHC 02/01/2023 30.7 (A)  31.4 - 35.0 g/dL Final    RDW 02/01/2023 13.2  11.9 - 14.6 % Final    Platelets 59/41/7624 312  150 - 450 K/uL Final    MPV 02/01/2023 9.8  9.4 - 12.3 FL Final    nRBC 02/01/2023 0.00  0.0 - 0.2 K/uL Final    **Note: Absolute NRBC parameter is now reported with Hemogram**    Differential Type 02/01/2023 AUTOMATED    Final    Seg Neutrophils 02/01/2023 61  43 - 78 % Final    Lymphocytes 02/01/2023 30  13 - 44 % Final    Monocytes 02/01/2023 7  4.0 - 12.0 % Final    Eosinophils % 02/01/2023 1  0.5 - 7.8 % Final    Basophils 02/01/2023 1  0.0 - 2.0 % Final    Immature Granulocytes 02/01/2023 0  0.0 - 5.0 % Final    Segs Absolute 02/01/2023 3.3  1.7 - 8.2 K/UL Final    Absolute Lymph # 02/01/2023 1.6  0.5 - 4.6 K/UL Final    Absolute Mono # 02/01/2023 0.4  0.1 - 1.3 K/UL Final    Absolute Eos # 02/01/2023 0.1  0.0 - 0.8 K/UL Final    Basophils Absolute 02/01/2023 0.0  0.0 - 0.2 K/UL Final    Absolute Immature Granulocyte 02/01/2023 0.0  0.0 - 0.5 K/UL Final         Treatment Summary has been discussed and given to patient: n/a        -------------------------------------------------------------------------------------------------------------------  Please call our office at [phone] if you have any  of the following symptoms:   Fever of 100.5 or greater  Chills  Shortness of breath  Swelling or pain in one leg    After office hours an answering service is available and will contact a provider for emergencies or if you are experiencing any of the above symptoms. Patient does express an interest in My Chart. My Chart log in information explained on the after visit summary printout at the [facility] desk.     Candance Boots RN

## 2023-08-18 ENCOUNTER — HOSPITAL ENCOUNTER (OUTPATIENT)
Dept: MAMMOGRAPHY | Age: 53
Discharge: HOME | End: 2023-08-18
Payer: COMMERCIAL

## 2023-08-18 VITALS — HEIGHT: 66 IN | WEIGHT: 188 LBS | BODY MASS INDEX: 30.22 KG/M2

## 2023-08-18 DIAGNOSIS — Z12.31 VISIT FOR SCREENING MAMMOGRAM: ICD-10-CM

## 2023-08-18 PROCEDURE — 77063 BREAST TOMOSYNTHESIS BI: CPT

## 2024-01-09 ENCOUNTER — HOSPITAL ENCOUNTER (OUTPATIENT)
Dept: CT IMAGING | Age: 54
Discharge: HOME | End: 2024-01-12
Attending: SURGERY

## 2024-01-09 DIAGNOSIS — K86.2 PANCREATIC CYST: ICD-10-CM

## 2024-01-18 NOTE — PROGRESS NOTES
[facility]  [address], [address]  [place] [address]  [phone]     2024  Patient:  Brenda Mcdermott  : 1970    HPI  Brenda Mcdermott is a 53 y.o. female who is seen in follow up for a pancreatic cyst. Today she has no complaints.     On 2022, she presented with a pancreatic cyst. The patient states that she had a laparoscopic cholecystectomy on 10/16/222. 10 days after her operation she began to have similar pain to her previous gallbladder attacks in the epigastric region and went to the ER and had a CT scan.  The patient underwent a CT scan on 2022 which revealed a cystic structure measuring 3.8 cm arising from the pancreatic tail. The patient then underwent an MRI on 2022 which revealed a large thin walled unilocular cyst arising from the pancreatic tail measuring 4.6 cm without additional suspicious features. The patient underwent EUS on 2022 that revealed suspicious side branch IPMN measuring at 48 mm, however, fluid CEA is only 24, and cytology is negative.     The patient underwent a recent CT of the pancreas on 2024 that revealed  Just inferior to the pancreas body/tail there is a 2.6 x 3.5 x 3.6 cm simple appearing cyst without obvious enhancement. (The cyst previously measured up to 4.6 cm on MRI from 2022, and 3.8 cm on CT scan from 2022).     At this time she has no new complaints. She is not having any abdominal pain. She is tolerating a diet. She denies any nausea or emesis. She is having regular bowel movements.     She has no other significant past medical history. She has a surgical history significant as listed above for a lap myla. She is not on any blood thinning medications.           Past Medical History:   Diagnosis Date    Leg pain, right 2018    since 2018     Current Outpatient Medications   Medication Sig Dispense Refill    Magnesium Oxide 200 MG TABS Take by mouth      Probiotic Product

## 2024-01-19 ENCOUNTER — OFFICE VISIT (OUTPATIENT)
Dept: SURGERY | Age: 54
End: 2024-01-19

## 2024-01-19 VITALS — HEIGHT: 66 IN | WEIGHT: 188 LBS | BODY MASS INDEX: 30.22 KG/M2

## 2024-01-19 DIAGNOSIS — K86.2 PANCREATIC CYST: Primary | ICD-10-CM

## 2024-09-12 ENCOUNTER — HOSPITAL ENCOUNTER (OUTPATIENT)
Dept: MAMMOGRAPHY | Age: 54
Discharge: HOME | End: 2024-09-15
Payer: COMMERCIAL

## 2024-09-12 DIAGNOSIS — Z12.31 ENCOUNTER FOR SCREENING MAMMOGRAM FOR BREAST CANCER: ICD-10-CM

## 2024-09-12 PROCEDURE — 77063 BREAST TOMOSYNTHESIS BI: CPT

## 2025-08-20 ENCOUNTER — TRANSCRIBE ORDERS (OUTPATIENT)
Facility: HOSPITAL | Age: 55
End: 2025-08-20

## 2025-08-20 ENCOUNTER — TRANSCRIBE ORDERS (OUTPATIENT)
Dept: SCHEDULING | Age: 55
End: 2025-08-20

## 2025-08-20 DIAGNOSIS — Z12.31 OTHER SCREENING MAMMOGRAM: Primary | ICD-10-CM
